# Patient Record
Sex: FEMALE | Race: WHITE | NOT HISPANIC OR LATINO | URBAN - METROPOLITAN AREA
[De-identification: names, ages, dates, MRNs, and addresses within clinical notes are randomized per-mention and may not be internally consistent; named-entity substitution may affect disease eponyms.]

---

## 2020-09-17 ENCOUNTER — EMERGENCY (EMERGENCY)
Facility: HOSPITAL | Age: 25
LOS: 1 days | Discharge: HOME | End: 2020-09-17
Admitting: PSYCHIATRY & NEUROLOGY
Payer: COMMERCIAL

## 2020-09-17 VITALS
SYSTOLIC BLOOD PRESSURE: 136 MMHG | DIASTOLIC BLOOD PRESSURE: 86 MMHG | RESPIRATION RATE: 18 BRPM | HEART RATE: 103 BPM | TEMPERATURE: 99 F | OXYGEN SATURATION: 99 %

## 2020-09-17 PROCEDURE — 90792 PSYCH DIAG EVAL W/MED SRVCS: CPT

## 2020-09-17 PROCEDURE — 99283 EMERGENCY DEPT VISIT LOW MDM: CPT

## 2020-09-17 NOTE — ED BEHAVIORAL HEALTH ASSESSMENT NOTE - REFERRAL / APPOINTMENT DETAILS
Patient refused resources.  Mother reports that she has names of psychologists/psychiatrists in [place] she will look into for patient

## 2020-09-17 NOTE — ED PROVIDER NOTE - OBJECTIVE STATEMENT
26 y/o F, no PMH or psychiatric Hx, presents to ED after making suicidal statements. Pt's friend, Macrina ([phone]), states that on a facetime call with the pt, the pt made concerning and suicidal statements. The friend reports that the pt stated that she wanted to kill herself by either using a plastic bag over the face or by hanging herself. Pt's friend reports that pt was looking up how long it takes to die after hanging. 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 Pt states that she was exaggerating and would never hurt herself. Pt denies HI, HA, VH, physical complaints, drug or alcohol use.

## 2020-09-17 NOTE — ED PROVIDER NOTE - PATIENT PORTAL LINK FT
You can access the FollowMyHealth Patient Portal offered by [facility] by registering at the following website: [email or web address]. By joining [employer]’s FollowMyHealth portal, you will also be able to view your health information using other applications (apps) compatible with our system.

## 2020-09-17 NOTE — ED BEHAVIORAL HEALTH ASSESSMENT NOTE - DESCRIPTION
Patient was calm, pleasant and cooperative in the ED and did not exhibit any aggression. Patient did not require any PRN medications or any physical restraints.     Vital Signs Last 24 Hrs  T(C): 37 (17 Sep 2020 22:03), Max: 37 (17 Sep 2020 22:03)  T(F): 98.6 (17 Sep 2020 22:03), Max: 98.6 (17 Sep 2020 22:03)  HR: 103 (17 Sep 2020 22:03) (103 - 103)  BP: 136/86 (17 Sep 2020 22:03) (136/86 - 136/86)  BP(mean): --  RR: 18 (17 Sep 2020 22:03) (18 - 18)  SpO2: 99% (17 Sep 2020 22:03) (99% - 99%) none lives with family between homes in [place] and [place], studying at [employer] for PHD in math hoping to become a

## 2020-09-17 NOTE — ED PROVIDER NOTE - NSFOLLOWUPCLINICS_GEN_ALL_ED_FT
[facility]  Behavioral Health  [address] [place] [address]  Phone: [phone]  Fax:   Follow Up Time:

## 2020-09-17 NOTE — ED PROVIDER NOTE - PROGRESS NOTE DETAILS
Patient cleared by psych, nontoxic and medically stable for discharge. Return precautions provided and patient understands to return to the ED for concerning or worsening signs and symptoms. Instructed to follow up with primary care physician and [facility] crisis center and agreeable. Patient's questions answered.

## 2020-09-17 NOTE — ED PROVIDER NOTE - NSFOLLOWUPINSTRUCTIONS_ED_ALL_ED_FT
Follow up with your primary care physician and psychiatrist in 48-72 hours.  You may also see the psychiatrist at [facility]:    [phone] [place] [address]  Phone: [phone]      SEEK IMMEDIATE MEDICAL CARE IF YOU HAVE ANY OF THE FOLLOWING SYMPTOMS: thoughts about hurting or killing yourself, thoughts about hurting or killing somebody else, hallucinations or any other worsening or persistent symptoms OR ANY NEW OR CONCERNING SYMPTOMS.

## 2020-09-17 NOTE — ED PROVIDER NOTE - CLINICAL SUMMARY MEDICAL DECISION MAKING FREE TEXT BOX
26 y/o F presents to ED after making suicidal statements to friend over facetime. Medical evaluation performed. There is no clinical evidence of intoxication or any acute medical problem requiring immediate intervention. Patient is awaiting psychiatric consultation. Final disposition will be determined by psychiatrist.

## 2020-09-17 NOTE — ED BEHAVIORAL HEALTH ASSESSMENT NOTE - HPI (INCLUDE ILLNESS QUALITY, SEVERITY, DURATION, TIMING, CONTEXT, MODIFYING FACTORS, ASSOCIATED SIGNS AND SYMPTOMS)
Patient is a 25 year-old  woman, currently living between homes in [place] and [place] with her family, currently enrolled in [employer], studying for PHD in mathematics, with no prior psychiatric history, currently not in outpatient treatment, without history of psychiatric hospitalization, ED visits, without history of self-injury or suicide attempts, with no past medical history, without history of aggression, violence or legal troubles, now presenting to the hospital for suicidal statements.    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  States that she is aware that patient has been upset and has been stressed related to a decision and problems.  Emphasizes that she has been aware that patient has been upset related to academic stressors.  Currently studying PHD in math virtually and is not happy with the program, the school and virtual learning.  Reports that she has had thoughts to drop out of the program.  Mother states that this is a big decision for her and realizes that it may not be a significant issue for some.  Reports that she spoke with patient yesterday and today after the phone call to the friend and patient was feeling calmer.  Mother reports that patient called a friend and states that patient may have stated things out of proportion ("that there is no way out or that she should not be here")and states that unbeknownst to her the friend called the suicide hotline and police and EMS.  Mother remains angry that she was not called first and states that this hospital visit could have been avoided and that they are currently in [place] on their way back and mother plans to return to apartment to be with patient and states that patient had been calm after she spoke with her prior to coming to the hospital.    Patient reports history of feeling stressed related to academics. Patient is a 25 year-old  woman, currently living between homes in [place] and [place] with her family, currently enrolled in [employer], studying for PHD in mathematics, with no prior psychiatric history, currently not in outpatient treatment, without history of psychiatric hospitalization, ED visits, without history of self-injury or suicide attempts, with no past medical history, without history of aggression, violence or legal troubles, now presenting to the hospital for suicidal statements.    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  States that she is aware that patient has been upset and has been stressed related to a decision and problems.  Emphasizes that she has been aware that patient has been upset related to academic stressors.  Currently studying PHD in math virtually and is not happy with the program, the school and virtual learning.  Reports that she has had thoughts to drop out of the program.  Mother states that this is a big decision for her and realizes that it may not be a significant issue for some.  Reports that she spoke with patient yesterday and today after the phone call to the friend and patient was feeling calmer.  Mother reports that patient called a friend and states that patient may have stated things out of proportion ("that there is no way out or that she should not be here")and states that unbeknownst to her the friend called the suicide hotline and police and EMS.  Mother remains angry that she was not called first and states that this hospital visit could have been avoided and that they are currently in [place] on their way back and mother plans to return to apartment to be with patient and states that patient had been calm after she spoke with her prior to coming to the hospital.    Patient reports history of feeling stressed related to academics. States that she is currently studying for PHD in [employer] to become a .  States that she studied college at [employer] and went to  in [place] and went to graduate school in [employer] in [place].  Described being frustrated with the homework, got upset and was stressed out.  States that the rest is a blur and that she might have made statements about killing herself but adamantly denies any plans or intent.  States that she did not intentionally make any gestures and states that she has never had any past suicidal ideations, intent, plans or gestures.  Patient denies any depressive symptoms including depressed mood, anhedonia, concentration/appetite, sleep disturbances, but states that sometimes she feels tired "Zoom fatigue" and overall states that she is busy so may sometimes not have the most time to eat or sleep.  Patient denies manic symptoms including elevated mood, increased irritability, mood lability, distractibility, grandiosity, pressured speech, increase in goal-directed activity, or decreased need for sleep. Patient denies symptoms of anxiety, but states that she always feels stressed.  Denies anxious mood, symptoms of social anxiety, OCD, PTSD, or panic disorder. Patient denies any psychotic symptoms including paranoia, or auditory/visual hallucinations. Patient adamantly denies suicidal/homicidal ideations, intent or plans. Patient is able to safety plan and contract for safety.  Refused resources and states that she does not feel that she needs it and states that her parents are sources of support that she can talk to.  No other acute safety concerns by patient or mother.

## 2020-09-17 NOTE — ED ADULT TRIAGE NOTE - CHIEF COMPLAINT QUOTE
Presents to ED for homa ornelas.  Patient was FaceTime her friend and made statements about wanting to hurt herself by using a trash bag and if the trash bag didn't work, she would hang herself.  Patient denies suicidal or homicidal thoughts.  No significant medical history. Presents to ED for pysch eval.  Patient was FaceTime her friend and made statements about wanting to hurt herself by using a trash bag and if the trash bag didn't work, she would hang herself.  Patient denies suicidal or homicidal thoughts.  No significant medical history.  Taken to  for eval. Presents to ED for psych eval.  Patient was FaceTime her friend and made statements about wanting to hurt herself by using a trash bag and if the trash bag didn't work, she would hang herself.  Patient denies suicidal or homicidal thoughts.  No significant medical history.  Taken to  for eval.

## 2020-09-17 NOTE — ED BEHAVIORAL HEALTH ASSESSMENT NOTE - SUMMARY
Patient is a 25 year-old  woman, currently living between homes in [place] with her family, currently enrolled in [employer], studying for PHD in [employer], with no prior psychiatric history, currently not in outpatient treatment, without history of psychiatric hospitalization, ED visits, without history of self-injury or suicide attempts, with no past medical history, without history of aggression, violence or legal troubles, now presenting to the hospital for suicidal statements.    Patient denies symptoms of depression, j carlos, anxiety, psychosis, suicidal/homicidal ideations, intent or plans, denies auditory/visual hallucinations.  Patient does not represent an imminent threat of danger to self or others at this time.  Patient refused voluntary admission.  Patient does not meet criteria for inpatient involuntary hospitalization.  Patient will be discharged home and patient and mother agree to discharge disposition.  No acute safety concerns.

## 2020-09-17 NOTE — ED BEHAVIORAL HEALTH ASSESSMENT NOTE - SAFETY PLAN ADDT'L DETAILS
Safety plan discussed with.../Education provided regarding environmental safety / lethal means restriction/Provision of National Suicide Prevention Lifeline 7-728-314-YVUX (2470)

## 2020-09-17 NOTE — ED BEHAVIORAL HEALTH ASSESSMENT NOTE - RISK ASSESSMENT
Acute Suicide Risk Low   Rationale:   Protective factors include no previous suicide attempts, no history of violence, no access to firearms, no global insomnia, supportive family and social supports, no suicidal/homicidal ideations intent or plans, hopefulness for future.     Risk factors of triggering events leading to despair    Elevated Chronic Risk   NO    Safety Plan Details:   Safety plan discussed with patient  Education provided regarding environmental safety / lethal means restriction  Provision of National Suicide Prevention Lifeline 9-176-998-TALK (0835) Low Acute Suicide Risk

## 2020-09-17 NOTE — ED BEHAVIORAL HEALTH ASSESSMENT NOTE - SAFETY PLAN DETAILS
Discussed locking up/removing dangerous items from home, including but not limited to weapons, knives, prescription and non prescription medications etc. Patient advised and agreed to return to ED or nearest hospital or call 911 for any worsening symptoms.

## 2020-09-17 NOTE — ED BEHAVIORAL HEALTH ASSESSMENT NOTE - DETAILS
patient denies actual thoughts but states that she may have made impulsive statements maternal grandmother with cancer mother spoke with friend. mother and patient aware of disposition and plans

## 2020-09-19 NOTE — ED BEHAVIORAL HEALTH NOTE - BEHAVIORAL HEALTH NOTE
High Risk Log: Writer called  spoke to pt's mother.  She states pt is doing fine and pt was just upset and called a friend who believed things were much more dire than they were.  She wished her friend had called her instead of the emergency services.  She is upset that patient was taken out by force by the police or EMS and states she and pt refused to go.  She would like an investigation as to why pt was taken by force.

## 2021-12-29 ENCOUNTER — RESULT REVIEW (OUTPATIENT)
Age: 26
End: 2021-12-29

## 2022-04-15 ENCOUNTER — NURSE TRIAGE (OUTPATIENT)
Dept: NURSING | Facility: CLINIC | Age: 27
End: 2022-04-15

## 2022-04-16 NOTE — TELEPHONE ENCOUNTER
Patient c/o itchy widespread rash on back, arms, chest and abdomen, warm to the touch. Started in the middle of the night last night.  Reports sore throat, able to swallow and drink fluids.     Reason for Disposition    SEVERE itching (i.e., interferes with sleep, normal activities or school)    [1] Rash AND [2] widespread (especially chest and abdomen)    Additional Information    Negative: [1] Life-threatening reaction (anaphylaxis) in the past to similar substance (e.g., food, insect bite/sting, chemical, etc.) AND [2] < 2 hours since exposure    Negative: [1] Sudden onset of rash (within last 2 hours) AND [2] difficulty with breathing or swallowing    Negative: Shock suspected (e.g., cold/pale/clammy skin, too weak to stand, low BP, rapid pulse)    Negative: Difficult to awaken or acting confused (e.g., disoriented, slurred speech)    Negative: [1] Purple or blood-colored spots or dots AND [2] fever    Negative: Sounds like a life-threatening emergency to the triager    Negative: [1] Widespread rash AND [2] bright red, sunburn-like AND [3] current tampon use or nasal packing    Negative: [1] Widespread rash AND [2] bright red, sunburn-like AND [3] wound infection or recent surgery    Negative: [1] Bright red skin AND [2] peels off in sheets    Negative: Stiff neck (unable to touch chin to chest)    Negative: Fever    Negative: Joint pain or swelling    Negative: Rash looks like large or small blisters (i.e., fluid filled bubbles or sacs on the skin)    Negative: Patient sounds very sick or weak to the triager    Negative: [1] Purple or blood-colored rash (spots or dots) AND [2] no fever AND [3] sounds well to triager    Negative: Sores in mouth    Negative: Face becomes swollen    Negative: [1] Headache AND [2] no fever    Negative: Severe difficulty breathing (e.g., struggling for each breath, speaks in single words, stridor)    Negative: Sounds like a life-threatening emergency to the triager    Negative: [1]  "Drooling or spitting out saliva (because can't swallow) AND [2] normal breathing    Negative: Unable to open mouth completely    Negative: [1] Difficulty breathing AND [2] not severe    Negative: Fever > 104 F (40 C)    Negative: [1] Refuses to drink anything AND [2] for > 12 hours    Negative: [1] Drinking very little AND [2] dehydration suspected (e.g., no urine > 12 hours, very dry mouth, very lightheaded)    Negative: Patient sounds very sick or weak to the triager    Negative: [1] Diagnosed strep throat AND [2] taking antibiotic AND [3] symptoms continue    Negative: Throat culture results, call about    Negative: Productive cough is main symptom    Negative: Non-productive cough is main symptom    Negative: Hoarseness is main symptom    Negative: Runny nose is main symptom    Negative: SEVERE (e.g., excruciating) throat pain    Negative: [1] Pus on tonsils (back of throat) AND [2]  fever AND [3] swollen neck lymph nodes (\"glands\")    Answer Assessment - Initial Assessment Questions  1. ONSET: \"When did the throat start hurting?\" (Hours or days ago)       Last night  2. SEVERITY: \"How bad is the sore throat?\" (Scale 1-10; mild, moderate or severe)    - MILD (1-3):  doesn't interfere with eating or normal activities    - MODERATE (4-7): interferes with eating some solids and normal activities    - SEVERE (8-10):  excruciating pain, interferes with most normal activities    - SEVERE DYSPHAGIA: can't swallow liquids, drooling      4-6/10  3. STREP EXPOSURE: \"Has there been any exposure to strep within the past week?\" If so, ask: \"What type of contact occurred?\"       No known  4.  VIRAL SYMPTOMS: \"Are there any symptoms of a cold, such as a runny nose, cough, hoarse voice or red eyes?\"       Small cough  5. FEVER: \"Do you have a fever?\" If so, ask: \"What is your temperature, how was it measured, and when did it start?\"      no  6. PUS ON THE TONSILS: \"Is there pus on the tonsils in the back of your throat?\"      " "no  7. OTHER SYMPTOMS: \"Do you have any other symptoms?\" (e.g., difficulty breathing, headache, rash)      Rash    Protocols used: RASH OR REDNESS - WIDESPREAD-A-AH, SORE THROAT-A-AH      "

## 2024-02-09 PROBLEM — Z78.9 OTHER SPECIFIED HEALTH STATUS: Chronic | Status: ACTIVE | Noted: 2020-09-17

## 2024-02-21 PROBLEM — Z00.00 ENCOUNTER FOR PREVENTIVE HEALTH EXAMINATION: Status: ACTIVE | Noted: 2024-02-21

## 2024-03-06 ENCOUNTER — APPOINTMENT (OUTPATIENT)
Dept: COLORECTAL SURGERY | Facility: CLINIC | Age: 29
End: 2024-03-06
Payer: COMMERCIAL

## 2024-03-06 VITALS
DIASTOLIC BLOOD PRESSURE: 70 MMHG | HEART RATE: 70 BPM | BODY MASS INDEX: 24.91 KG/M2 | RESPIRATION RATE: 12 BRPM | HEIGHT: 66 IN | TEMPERATURE: 98.4 F | SYSTOLIC BLOOD PRESSURE: 110 MMHG | WEIGHT: 155 LBS

## 2024-03-06 DIAGNOSIS — Z78.9 OTHER SPECIFIED HEALTH STATUS: ICD-10-CM

## 2024-03-06 DIAGNOSIS — Z83.2 FAMILY HISTORY OF DISEASES OF THE BLOOD AND BLOOD-FORMING ORGANS AND CERTAIN DISORDERS INVOLVING THE IMMUNE MECHANISM: ICD-10-CM

## 2024-03-06 DIAGNOSIS — K64.9 UNSPECIFIED HEMORRHOIDS: ICD-10-CM

## 2024-03-06 PROCEDURE — 99202 OFFICE O/P NEW SF 15 MIN: CPT | Mod: 25

## 2024-03-06 PROCEDURE — 45300 PROCTOSIGMOIDOSCOPY DX: CPT

## 2024-03-06 RX ORDER — SPIRONOLACTONE 100 MG/1
100 TABLET ORAL
Refills: 0 | Status: ACTIVE | COMMUNITY

## 2024-03-06 NOTE — HISTORY OF PRESENT ILLNESS
[FreeTextEntry1] : 30yo WF developed painful perianal tissue swelling @2mos ago. Pain disappeared after one week, swelling persisted awhile longer. Approx 3 weeks ago symptoms repeated. PCP prescribed Hydrocort cream without improvement. Advised to schedule colorectal evaluation.  Daily BM. Denies straining, prolonged sitting. Denies abdominal pian. Denies FH Colon CA/IBD. Denies prior colonoscopy.

## 2024-03-06 NOTE — ASSESSMENT
[FreeTextEntry1] : Pleasant 29-year-old female who presents with a chief complaint of painful anorectal tissue swelling.  Approximately a month ago the patient noted anal tissue swelling associated with pain.  The pain diminished and then eventually the swelling diminished.  Recently the pain and the swelling returned.  She feels the swelling is even larger than it was last time.  Currently the pain has resolved but the swelling persists.  She denies rectal bleeding.  She does not strain at stool.  The family history is negative for inflammatory bowel disease or colorectal cancer.  Inspection of the anorectal area reveals a right anterior external hemorrhoid with a small element of thrombosis present.  Digital exam is otherwise unremarkable.  Sigmoidoscopy to 15 cm is negative.  Patient has a recurrent symptomatic external hemorrhoid/thrombosis.  If this persist, I would recommend excision in the ambulatory unit.  The procedure and recuperation were discussed with the patient.  Questions were answered.

## 2024-03-06 NOTE — PHYSICAL EXAM
[Normal Breath Sounds] : Normal breath sounds [Normal Heart Sounds] : normal heart sounds [Normal Rate and Rhythm] : normal rate and rhythm [No Edema] : No edema [Alert] : alert [Oriented to Person] : oriented to person [Oriented to Place] : oriented to place [Oriented to Time] : oriented to time [Calm] : calm [de-identified] : flat +BS NT/ND [de-identified] : +ROM [de-identified] : NC/AT [de-identified] : intact

## 2024-03-09 ENCOUNTER — TRANSCRIPTION ENCOUNTER (OUTPATIENT)
Age: 29
End: 2024-03-09

## 2024-03-20 ENCOUNTER — TRANSCRIPTION ENCOUNTER (OUTPATIENT)
Age: 29
End: 2024-03-20

## 2024-06-17 ENCOUNTER — APPOINTMENT (OUTPATIENT)
Dept: GASTROENTEROLOGY | Facility: CLINIC | Age: 29
End: 2024-06-17
